# Patient Record
Sex: MALE | Race: BLACK OR AFRICAN AMERICAN | URBAN - METROPOLITAN AREA
[De-identification: names, ages, dates, MRNs, and addresses within clinical notes are randomized per-mention and may not be internally consistent; named-entity substitution may affect disease eponyms.]

---

## 2017-04-04 ENCOUNTER — OFFICE VISIT (OUTPATIENT)
Dept: FAMILY MEDICINE | Facility: CLINIC | Age: 22
End: 2017-04-04

## 2017-04-04 VITALS — TEMPERATURE: 100.2 F

## 2017-04-04 DIAGNOSIS — J45.909 RAD (REACTIVE AIRWAY DISEASE), UNSPECIFIED ASTHMA SEVERITY, UNCOMPLICATED: ICD-10-CM

## 2017-04-04 DIAGNOSIS — J11.1 INFLUENZA: Primary | ICD-10-CM

## 2017-04-04 RX ORDER — ALBUTEROL SULFATE 90 UG/1
2 AEROSOL, METERED RESPIRATORY (INHALATION) EVERY 6 HOURS PRN
Qty: 1 INHALER | Refills: 1 | Status: SHIPPED | OUTPATIENT
Start: 2017-04-04

## 2017-04-04 RX ORDER — PREDNISONE 20 MG/1
60 TABLET ORAL DAILY
Qty: 9 TABLET | Refills: 0 | Status: SHIPPED | OUTPATIENT
Start: 2017-04-04 | End: 2017-04-09

## 2017-04-04 RX ORDER — OSELTAMIVIR PHOSPHATE 75 MG/1
75 CAPSULE ORAL 2 TIMES DAILY
Qty: 10 CAPSULE | Refills: 0 | Status: SHIPPED | OUTPATIENT
Start: 2017-04-04

## 2017-04-04 NOTE — LETTER
4/4/2017      RE: Shakir Gilmore  [address]  [facility] [address]       SUBJECTIVE: 21 year old male complaining of low grade fever, aches, and an episode of vomiting for 2 day(s).   The patient describes exposure to others on the team with influenza.   The patient has a history of asthma, and has had some wheezing    Relevant past medical history: positive for RAD, asthma.    OBJECTIVE: The patient appears mildly ill, coughing during exam, alert and no distress.   EARS: negative  NOSE/SINUS: Nares normal. Septum midline. Mucosa abnormal- red. Has clear drainage, no sinus tenderness.   THROAT: normal , no exudates  NECK:Neck supple. Mild anterior cervical adenopathy. Thyroid symmetric, normal size,, Carotids without bruits.   CHEST: Clear to auscultation, mild cough and wheezes throughout    ASSESSMENT/plan  20 yo male with influenza symptoms and some wheezing due to RAD  -start tamiflu  -Prednisone x 5 days and restart albuterol  Discussed with villa and ATC  Dr Jose Garcia MD

## 2017-04-04 NOTE — MR AVS SNAPSHOT
After Visit Summary   2017    Shakir Gilmore    MRN: [id number]           Patient Information     Date Of Birth          1995        Visit Information        Provider Department      2017 7:00 PM Manjit Garcia MD [facility]        Today's Diagnoses     Influenza    -  1    RAD (reactive airway disease), unspecified asthma severity, uncomplicated           Follow-ups after your visit        Who to contact     Please call your clinic at [phone] to:    Ask questions about your health    Make or cancel appointments    Discuss your medicines    Learn about your test results    Speak to your doctor   If you have compliments or concerns about an experience at your clinic, or if you wish to file a complaint, please contact [facility] Patient Relations at [phone] or email us at [email or web address]         Additional Information About Your Visit        MyChart Information     Appdra is an electronic gateway that provides easy, online access to your medical records. With App[employer], you can request a clinic appointment, read your test results, renew a prescription or communicate with your care team.     To sign up for [employer] visit the website at [email or web address]   You will be asked to enter the access code listed below, as well as some personal information. Please follow the directions to create your username and password.     Your access code is: [email or web address]  Expires: 2017  1:23 PM     Your access code will  in 90 days. If you need help or a new code, please contact your [facility] or call [phone] for assistance.        Care EveryWhere ID     This is your Care EveryWhere ID. This could be used by other organizations to access your [facility] medical records  [id number]        Your Vitals Were     Temperature                   100.2  F (37.9  C)            Blood  Pressure from Last 3 Encounters:   12/09/16 120/72   06/07/16 111/61   05/05/16 124/78    Weight from Last 3 Encounters:   12/09/16 92.1 kg (203 lb) (94 %)*   09/12/16 96.4 kg (212 lb 9.6 oz) (96 %)*   06/07/16 89.8 kg (198 lb) (93 %)*     * Growth percentiles are based on [place] 2-20 Years data.              Today, you had the following     No orders found for display         Today's Medication Changes          These changes are accurate as of: 4/4/17 11:59 PM.  If you have any questions, ask your nurse or doctor.               Start taking these medicines.        Dose/Directions    albuterol 108 (90 BASE) MCG/ACT Inhaler   Commonly known as:  PROAIR HFA/PROVENTIL HFA/VENTOLIN HFA   Used for:  Influenza, RAD (reactive airway disease), unspecified asthma severity, uncomplicated        Dose:  2 puff   Inhale 2 puffs into the lungs every 6 hours as needed for shortness of breath / dyspnea or wheezing   Quantity:  1 Inhaler   Refills:  1       oseltamivir 75 MG capsule   Commonly known as:  TAMIFLU   Used for:  Influenza, RAD (reactive airway disease), unspecified asthma severity, uncomplicated        Dose:  75 mg   Take 1 capsule (75 mg) by mouth 2 times daily   Quantity:  10 capsule   Refills:  0       predniSONE 20 MG tablet   Commonly known as:  DELTASONE   Used for:  RAD (reactive airway disease), unspecified asthma severity, uncomplicated, Influenza        Dose:  60 mg   Take 3 tablets (60 mg) by mouth daily for 5 days   Quantity:  9 tablet   Refills:  0            Where to get your medicines      These medications were sent to [facility] [id number] [place], [place] - [address] [place] [address]  [address] [place] [id number]     Phone:  [phone]     albuterol 108 (90 BASE) MCG/ACT Inhaler    oseltamivir 75 MG capsule    predniSONE 20 MG tablet                Primary Care Provider    None Specified       No primary provider on file.        Thank you!     Thank you for choosing [facility]   for your care. Our goal is always to provide you with excellent care. Hearing back from our patients is one way we can continue to improve our services. Please take a few minutes to complete the written survey that you may receive in the mail after your visit with us. Thank you!             Your Updated Medication List - Protect others around you: Learn how to safely use, store and throw away your medicines at www.disposemymeds.org.          This list is accurate as of: 4/4/17 11:59 PM.  Always use your most recent med list.                   Brand Name Dispense Instructions for use    albuterol 108 (90 BASE) MCG/ACT Inhaler    PROAIR HFA/PROVENTIL HFA/VENTOLIN HFA    1 Inhaler    Inhale 2 puffs into the lungs every 6 hours as needed for shortness of breath / dyspnea or wheezing       oseltamivir 75 MG capsule    TAMIFLU    10 capsule    Take 1 capsule (75 mg) by mouth 2 times daily       predniSONE 20 MG tablet    DELTASONE    9 tablet    Take 3 tablets (60 mg) by mouth daily for 5 days

## 2017-04-05 NOTE — PROGRESS NOTES
SUBJECTIVE: 21 year old male complaining of low grade fever, aches, and an episode of vomiting for 2 day(s).   The patient describes exposure to others on the team with influenza.   The patient has a history of asthma, and has had some wheezing    Relevant past medical history: positive for RAD, asthma.    OBJECTIVE: The patient appears mildly ill, coughing during exam, alert and no distress.   EARS: negative  NOSE/SINUS: Nares normal. Septum midline. Mucosa abnormal- red. Has clear drainage, no sinus tenderness.   THROAT: normal , no exudates  NECK:Neck supple. Mild anterior cervical adenopathy. Thyroid symmetric, normal size,, Carotids without bruits.   CHEST: Clear to auscultation, mild cough and wheezes throughout    ASSESSMENT/plan  22 yo male with influenza symptoms and some wheezing due to RAD  -start tamiflu  -Prednisone x 5 days and restart albuterol  Discussed with villa and ATC  Dr Garcia

## 2017-09-13 DIAGNOSIS — R30.0 DYSURIA: Primary | ICD-10-CM

## 2017-09-13 DIAGNOSIS — R30.0 DYSURIA: ICD-10-CM

## 2017-09-13 DIAGNOSIS — A74.9 CHLAMYDIA: Primary | ICD-10-CM

## 2017-09-13 RX ORDER — CEFIXIME 400 MG/1
1 CAPSULE ORAL ONCE
Qty: 1 CAPSULE | Refills: 0 | Status: SHIPPED | OUTPATIENT
Start: 2017-09-13 | End: 2017-09-13

## 2017-09-13 RX ORDER — AZITHROMYCIN 500 MG/1
1000 TABLET, FILM COATED ORAL ONCE
Qty: 2 TABLET | Refills: 0 | Status: SHIPPED | OUTPATIENT
Start: 2017-09-13 | End: 2017-09-13